# Patient Record
(demographics unavailable — no encounter records)

---

## 2025-02-12 NOTE — REVIEW OF SYSTEMS
[Under Stress] : under stress [Weight Loss (___ Lbs)] : no recent weight loss [Chest Discomfort] : no chest discomfort [Palpitations] : no palpitations

## 2025-02-12 NOTE — PHYSICAL EXAM
[Obese] : obese [Normal S1, S2] : normal S1, S2 [Clear Lung Fields] : clear lung fields [Normal Gait] : normal gait [No Edema] : no edema [Alert and Oriented] : alert and oriented [No Acute Distress] : no acute distress [Good Air Entry] : good air entry [de-identified] : Warm, dry

## 2025-02-12 NOTE — HISTORY OF PRESENT ILLNESS
[FreeTextEntry1] : Fernando Xavier is a 77-year-old man with a history of hyperlipidemia, hypertension, coronary calcifications, 1st degree AV block, polymyalgia rheumatica, thalassemia trait, neuropathy, and obesity, who returns for cardiac examination.  He has been feeling well since our last encounter.  He has been more sedentary over the winter months but without new complaints.  He has been tolerating Rx.  * This visit was conducted as part of ongoing, longitudinal medical care for patient's chronic medical diagnoses and other issues.

## 2025-02-12 NOTE — DISCUSSION/SUMMARY
[With Me] : with me [___ Month(s)] : in [unfilled] month(s) [FreeTextEntry1] :  Hypertension: Controlled; continue telmisartan  Coronary calcification: Normal myocardial perfusion SPECT several months ago and no angina; he declined ECG during today's visit; continue medical management with atorvastatin and aspirin.  Hyperlipidemia: Controlled; continue atorvastatin.

## 2025-02-12 NOTE — CARDIOLOGY SUMMARY
[de-identified] : 8/7/2024.  Ectopic atrial rhythm [de-identified] : 10/3/2024. Patient achieved 7.0 METS, which is consistent with average exercise capacity. No chest pain during test. Stress electrocardiogram: No ischemic ST segment changes. Arrhythmias: none. Normal myocardial perfusion scan, with no evidence of infarction or inducible ischemia. The left ventricle is normal in function. The post stress left ventricular ejection fraction is 77 %. [de-identified] : 9/11/17, Normal left ventricular size and function. Mild diastolic dysfunction. Normal right ventricular size and function. Mild left atrial enlargement. Minimal tricuspid regurgitation. LVEF 59%. [de-identified] : 7/9/2024. Coronary calcium score 291

## 2025-04-07 NOTE — ASSESSMENT
[FreeTextEntry1] : ANKLE BRACE MOBIC  HLD CONTINUE ATORVATATIN HTN CONTINUE TELMISARTAN lab evaluation

## 2025-04-07 NOTE — PHYSICAL EXAM
[No Acute Distress] : no acute distress [PERRL] : pupils equal round and reactive to light [Normal TMs] : both tympanic membranes were normal [Supple] : supple [Clear to Auscultation] : lungs were clear to auscultation bilaterally [Regular Rhythm] : with a regular rhythm [No Edema] : there was no peripheral edema [No Masses] : no palpable masses [Normal] : soft, non-tender, non-distended, no masses palpated, no HSM and normal bowel sounds [Normal Supraclavicular Nodes] : no supraclavicular lymphadenopathy [Normal Anterior Cervical Nodes] : no anterior cervical lymphadenopathy [No CVA Tenderness] : no CVA  tenderness [Grossly Normal Strength/Tone] : grossly normal strength/tone [No Skin Lesions] : no skin lesions [No Focal Deficits] : no focal deficits [Normal Affect] : the affect was normal [de-identified] : RT ANKLE PAIN ON INVERSION

## 2025-04-07 NOTE — HISTORY OF PRESENT ILLNESS
[FreeTextEntry1] : pt c/o  rt ankle pain for 1 wk and management of HTN AND HTN [de-identified] : pt saw ortho x-rays neg HAS  STIFFNESS IN AM